# Patient Record
Sex: MALE | Race: WHITE | NOT HISPANIC OR LATINO | Employment: OTHER | URBAN - METROPOLITAN AREA
[De-identification: names, ages, dates, MRNs, and addresses within clinical notes are randomized per-mention and may not be internally consistent; named-entity substitution may affect disease eponyms.]

---

## 2017-11-28 ENCOUNTER — ALLIED HEALTH/NURSE VISIT (OUTPATIENT)
Dept: NURSING | Facility: CLINIC | Age: 62
End: 2017-11-28
Payer: COMMERCIAL

## 2017-11-28 DIAGNOSIS — Z23 NEED FOR PROPHYLACTIC VACCINATION AND INOCULATION AGAINST INFLUENZA: Primary | ICD-10-CM

## 2017-11-28 PROCEDURE — 90686 IIV4 VACC NO PRSV 0.5 ML IM: CPT

## 2017-11-28 PROCEDURE — 99207 ZZC NO CHARGE NURSE ONLY: CPT

## 2017-11-28 PROCEDURE — 90471 IMMUNIZATION ADMIN: CPT

## 2017-11-28 NOTE — MR AVS SNAPSHOT
"              After Visit Summary   2017    Abdelrahman Arriaga    MRN: [id number]           Patient Information     Date Of Birth          1955        Visit Information        Provider Department      2017 11:30 AM CS [place] NURSE [facility]        Today's Diagnoses     Need for prophylactic vaccination and inoculation against influenza    -  1       Follow-ups after your visit        Who to contact     If you have questions or need follow up information about today's clinic visit or your schedule please contact [facility] directly at [phone].  Normal or non-critical lab and imaging results will be communicated to you by TestQuesthart, letter or phone within 4 business days after the clinic has received the results. If you do not hear from us within 7 days, please contact the clinic through Nascentrict or phone. If you have a critical or abnormal lab result, we will notify you by phone as soon as possible.  Submit refill requests through SportsHedge or call your pharmacy and they will forward the refill request to us. Please allow 3 business days for your refill to be completed.          Additional Information About Your Visit        MyChart Information     SportsHedge lets you send messages to your doctor, view your test results, renew your prescriptions, schedule appointments and more. To sign up, go to [email or web address] . Click on \"Log in\" on the left side of the screen, which will take you to the Welcome page. Then click on \"Sign up Now\" on the right side of the page.     You will be asked to enter the access code listed below, as well as some personal information. Please follow the directions to create your username and password.     Your access code is: [email or web address]  Expires: 2018 11:39 AM     Your access code will  in 90 days. If you need help or a new code, please call your [facility] or [phone].        Care EveryWhere ID     This is your Care EveryWhere ID. This " could be used by other organizations to access your [place] medical records  [id number]         Blood Pressure from Last 3 Encounters:   No data found for BP    Weight from Last 3 Encounters:   No data found for Wt              We Performed the Following     FLU VAC, SPLIT VIRUS IM > 3 YO (QUADRIVALENT) [96418]     Vaccine Administration, Initial [64405]        Primary Care Provider    None Specified       No primary provider on file.        Equal Access to Services     [facility]: Hadii aad ku hadasho Soomaali, waaxda luqadaha, qaybta kaalmada adeegyada, waxjesus mccullough asimn adeadalgisa santamariayolandaleeann dumont . So [facility] [phone].    ATENCIÓN: Si habla español, tiene a mondragon disposición servicios gratuitos de asistencia lingüística. Llame al [phone].    We comply with applicable federal civil rights laws and [place] laws. We do not discriminate on the basis of race, color, national origin, age, disability, sex, sexual orientation, or gender identity.            Thank you!     Thank you for choosing [facility]  for your care. Our goal is always to provide you with excellent care. Hearing back from our patients is one way we can continue to improve our services. Please take a few minutes to complete the written survey that you may receive in the mail after your visit with us. Thank you!             Your Updated Medication List - Protect others around you: Learn how to safely use, store and throw away your medicines at www.disposemymeds.org.      Notice  As of 11/28/2017 11:39 AM    You have not been prescribed any medications.

## 2017-11-28 NOTE — NURSING NOTE
Injectable Influenza Immunization Documentation    1.  Is the person to be vaccinated sick today?  No    2. Does the person to be vaccinated have an allergy to eggs or to a component of the vaccine?  No    3. Has the person to be vaccinated today ever had a serious reaction to influenza vaccine in the past?  No    4. Has the person to be vaccinated ever had Guillain-[place] syndrome?  No     Form completed verbally with patient. Beatrice TUCKER MA

## 2018-07-16 ENCOUNTER — OFFICE VISIT (OUTPATIENT)
Dept: URGENT CARE | Facility: URGENT CARE | Age: 63
End: 2018-07-16
Payer: COMMERCIAL

## 2018-07-16 VITALS
SYSTOLIC BLOOD PRESSURE: 141 MMHG | TEMPERATURE: 97 F | OXYGEN SATURATION: 98 % | DIASTOLIC BLOOD PRESSURE: 102 MMHG | HEART RATE: 66 BPM

## 2018-07-16 DIAGNOSIS — H92.02 LEFT EAR PAIN: Primary | ICD-10-CM

## 2018-07-16 PROCEDURE — 99213 OFFICE O/P EST LOW 20 MIN: CPT | Performed by: FAMILY MEDICINE

## 2018-07-16 NOTE — PROGRESS NOTES
SUBJECTIVE:  Abdelrahman Arriaga is a 62 year old male who presents with left ear fullness for 6 week(s).   Severity: mild and moderate   Timing:gradual onset and still present  Additional symptoms include congestion.      History of recurrent otitis: no    No past medical history on file.  No current outpatient prescriptions on file.     Social History   Substance Use Topics     Smoking status: Never Smoker     Smokeless tobacco: Never Used     Alcohol use Not on file       ROS:   CONSTITUTIONAL:NEGATIVE for fever, chills, change in weight  INTEGUMENTARY/SKIN: NEGATIVE for worrisome rashes, moles or lesions    OBJECTIVE:  BP (!) 141/102  Pulse 66  Temp 97  F (36.1  C) (Oral)  SpO2 98%   EXAM:  The right TM is normal: no effusions, no erythema, and normal landmarks     The right auditory canal is normal and without drainage, edema or erythema  The left TM is not visualized secondary to cerumen  The left auditory canal is obstructed with cerumen  Oropharynx exam is normal: no lesions, erythema, adenopathy or exudate.  GENERAL: no acute distress  EYES: EOMI,  PERRL, conjunctiva clear  NECK: supple, non-tender to palpation, no adenopathy noted  RESP: lungs clear to auscultation - no rales, rhonchi or wheezes  CV: regular rates and rhythm, normal S1 S2, no murmur noted  SKIN: no suspicious lesions or rashes     ASSESSMENT:  1. Left ear pain  Patient counseled about cerumen and need for removal.   Patient will use otc mineral oil and warm water lavage at home for prevention.   Pt instructed to come back to the clinic for worsening sx

## 2018-07-16 NOTE — MR AVS SNAPSHOT
"              After Visit Summary   2018    Abdelrahman Arriaga    MRN: [id number]           Patient Information     Date Of Birth          1955        Visit Information        Provider Department      2018 5:15 PM Tomy Estrada MD [facility]        Today's Diagnoses     Left ear pain    -  1       Follow-ups after your visit        Who to contact     If you have questions or need follow up information about today's clinic visit or your schedule please contact [facility] directly at [phone].  Normal or non-critical lab and imaging results will be communicated to you by InVitaehart, letter or phone within 4 business days after the clinic has received the results. If you do not hear from us within 7 days, please contact the clinic through InVitaehart or phone. If you have a critical or abnormal lab result, we will notify you by phone as soon as possible.  Submit refill requests through CafeMom or call your pharmacy and they will forward the refill request to us. Please allow 3 business days for your refill to be completed.          Additional Information About Your Visit        MyChart Information     CafeMom lets you send messages to your doctor, view your test results, renew your prescriptions, schedule appointments and more. To sign up, go to [email or web address] . Click on \"Log in\" on the left side of the screen, which will take you to the Welcome page. Then click on \"Sign up Now\" on the right side of the page.     You will be asked to enter the access code listed below, as well as some personal information. Please follow the directions to create your username and password.     Your access code is: [email or web address]  Expires: 10/14/2018  6:13 PM     Your access code will  in 90 days. If you need help or a new code, please call your [facility] or [phone].        Care EveryWhere ID     This is your Care EveryWhere ID. This could be used by other " organizations to access your [place] medical records  [id number]        Your Vitals Were     Pulse Temperature Pulse Oximetry             66 97  F (36.1  C) (Oral) 98%          Blood Pressure from Last 3 Encounters:   07/16/18 (!) 141/102    Weight from Last 3 Encounters:   No data found for Wt              Today, you had the following     No orders found for display       Primary Care Provider Office Phone # Fax #    Jaydon WHEELER MD [facility] [phone] [address] [facility] [address]  [facility] [address]        Equal Access to Services     [facility]: Hadii aad ku hadasho Soomaali, waaxda luqadaha, qaybta kaalmada adeegyada, waxay idiin hayaan adeeg kharaleeann dumont . So [facility] [phone].    ATENCIÓN: Si habla español, tiene a mondragon disposición servicios gratuitos de asistencia lingüística. Llame al [phone].    We comply with applicable federal civil rights laws and [place] laws. We do not discriminate on the basis of race, color, national origin, age, disability, sex, sexual orientation, or gender identity.            Thank you!     Thank you for choosing [facility]  for your care. Our goal is always to provide you with excellent care. Hearing back from our patients is one way we can continue to improve our services. Please take a few minutes to complete the written survey that you may receive in the mail after your visit with us. Thank you!             Your Updated Medication List - Protect others around you: Learn how to safely use, store and throw away your medicines at www.disposemymeds.org.      Notice  As of 7/16/2018  6:13 PM    You have not been prescribed any medications.

## 2018-11-19 ENCOUNTER — ALLIED HEALTH/NURSE VISIT (OUTPATIENT)
Dept: NURSING | Facility: CLINIC | Age: 63
End: 2018-11-19
Payer: COMMERCIAL

## 2018-11-19 DIAGNOSIS — Z23 NEED FOR PROPHYLACTIC VACCINATION AND INOCULATION AGAINST INFLUENZA: Primary | ICD-10-CM

## 2018-11-19 PROCEDURE — 90471 IMMUNIZATION ADMIN: CPT

## 2018-11-19 PROCEDURE — 90686 IIV4 VACC NO PRSV 0.5 ML IM: CPT

## 2018-11-19 NOTE — PROGRESS NOTES
Injectable Influenza Immunization Documentation    1.  Is the person to be vaccinated sick today?   No    2. Does the person to be vaccinated have an allergy to a component   of the vaccine?   No  Egg Allergy Algorithm Link    3. Has the person to be vaccinated ever had a serious reaction   to influenza vaccine in the past?   No    4. Has the person to be vaccinated ever had Guillain-Barré syndrome?   No    Form completed by Beatrice TUCKER MA    Prior to injection verified patient identity using patient's name and date of birth.  Due to injection administration, patient instructed to remain in clinic for 15 minutes  afterwards, and to report any adverse reaction to me immediately.

## 2018-11-19 NOTE — MR AVS SNAPSHOT
"              After Visit Summary   2018    Abdelrahman Arriaga    MRN: [id number]           Patient Information     Date Of Birth          1955        Visit Information        Provider Department      2018 11:00 AM CS NURSE [facility] [place]        Today's Diagnoses     Need for prophylactic vaccination and inoculation against influenza    -  1       Follow-ups after your visit        Who to contact     If you have questions or need follow up information about today's clinic visit or your schedule please contact [facility] directly at [phone].  Normal or non-critical lab and imaging results will be communicated to you by Airborne Technologyhart, letter or phone within 4 business days after the clinic has received the results. If you do not hear from us within 7 days, please contact the clinic through [employer] or phone. If you have a critical or abnormal lab result, we will notify you by phone as soon as possible.  Submit refill requests through Actito or call your pharmacy and they will forward the refill request to us. Please allow 3 business days for your refill to be completed.          Additional Information About Your Visit        MyChart Information     Actito lets you send messages to your doctor, view your test results, renew your prescriptions, schedule appointments and more. To sign up, go to [email or web address] . Click on \"Log in\" on the left side of the screen, which will take you to the Welcome page. Then click on \"Sign up Now\" on the right side of the page.     You will be asked to enter the access code listed below, as well as some personal information. Please follow the directions to create your username and password.     Your access code is: [email or web address]  Expires: 2019 11:58 AM     Your access code will  in 90 days. If you need help or a new code, please call your [facility] or [phone].        Care EveryWhere ID     This is your Care EveryWhere ID. This " could be used by other organizations to access your [place] medical records  [id number]         Blood Pressure from Last 3 Encounters:   07/16/18 (!) 141/102    Weight from Last 3 Encounters:   No data found for Wt              We Performed the Following     FLU VACCINE, SPLIT VIRUS, IM (QUADRIVALENT) [83765]- >3 YRS        Primary Care Provider Office Phone # Fax #    Jaydon Almonte [phone] [address] [address]        Equal Access to Services     [facility]: Hadii aad ku hadasho Soomaali, waaxda luqadaha, qaybta kaalmada adeegyada, waxay idiin hayaan adeeg kharaleeann dumont . So [facility] [phone].    ATENCIÓN: Si habla español, tiene a mondragon disposición servicios gratuitos de asistencia lingüística. [facility] [phone].    We comply with applicable federal civil rights laws and [place] laws. We do not discriminate on the basis of race, color, national origin, age, disability, sex, sexual orientation, or gender identity.            Thank you!     Thank you for choosing [facility]  for your care. Our goal is always to provide you with excellent care. Hearing back from our patients is one way we can continue to improve our services. Please take a few minutes to complete the written survey that you may receive in the mail after your visit with us. Thank you!             Your Updated Medication List - Protect others around you: Learn how to safely use, store and throw away your medicines at www.disposemymeds.org.      Notice  As of 11/19/2018 11:58 AM    You have not been prescribed any medications.

## 2020-06-15 ENCOUNTER — TRANSFERRED RECORDS (OUTPATIENT)
Dept: HEALTH INFORMATION MANAGEMENT | Facility: CLINIC | Age: 65
End: 2020-06-15

## 2020-11-17 ENCOUNTER — TRANSFERRED RECORDS (OUTPATIENT)
Dept: HEALTH INFORMATION MANAGEMENT | Facility: CLINIC | Age: 65
End: 2020-11-17

## 2021-04-13 ENCOUNTER — TELEPHONE (OUTPATIENT)
Dept: FAMILY MEDICINE | Facility: CLINIC | Age: 66
End: 2021-04-13

## 2021-04-13 NOTE — TELEPHONE ENCOUNTER
Reason for Call:  Same Day Appointment, Requested Provider:  Dr Almonte    PCP: Jaydon Almonte    Reason for visit: pt would like to come back as pt last visit w Dr Almonte was  2016- pt was informed that Dr Almonte is not accepting pt's but he requested that we ask    Duration of symptoms: N/A    Have you been treated for this in the past? No    Additional comments: scheduled for an appt w Dr Morales on 4/14/2021- but would like to have a primary for any follow-up that might be needed    Can we leave a detailed message on this number? YES    Phone number patient can be reached at: Home number on file [phone] (home)    Best Time: any    Call taken on 4/13/2021 at 4:45 PM by Latia Sibley

## 2021-04-14 ENCOUNTER — OFFICE VISIT (OUTPATIENT)
Dept: FAMILY MEDICINE | Facility: CLINIC | Age: 66
End: 2021-04-14
Payer: COMMERCIAL

## 2021-04-14 VITALS
HEIGHT: 70 IN | DIASTOLIC BLOOD PRESSURE: 86 MMHG | BODY MASS INDEX: 24.48 KG/M2 | SYSTOLIC BLOOD PRESSURE: 139 MMHG | WEIGHT: 171 LBS | OXYGEN SATURATION: 97 % | TEMPERATURE: 97 F | HEART RATE: 76 BPM

## 2021-04-14 DIAGNOSIS — B35.3 TINEA PEDIS OF RIGHT FOOT: Primary | ICD-10-CM

## 2021-04-14 DIAGNOSIS — R35.0 URINARY FREQUENCY: ICD-10-CM

## 2021-04-14 PROBLEM — J30.9 ALLERGIC RHINITIS: Status: ACTIVE | Noted: 2021-04-14

## 2021-04-14 PROBLEM — J45.40 MODERATE PERSISTENT ASTHMA: Status: ACTIVE | Noted: 2021-04-14

## 2021-04-14 LAB
ALBUMIN UR-MCNC: NEGATIVE MG/DL
APPEARANCE UR: CLEAR
BILIRUB UR QL STRIP: NEGATIVE
COLOR UR AUTO: YELLOW
GLUCOSE UR STRIP-MCNC: NEGATIVE MG/DL
HBA1C MFR BLD: 5.6 % (ref 0–5.6)
HGB UR QL STRIP: NEGATIVE
KETONES UR STRIP-MCNC: NEGATIVE MG/DL
LEUKOCYTE ESTERASE UR QL STRIP: ABNORMAL
NITRATE UR QL: NEGATIVE
NON-SQ EPI CELLS #/AREA URNS LPF: ABNORMAL /LPF
PH UR STRIP: 7 PH (ref 5–7)
RBC #/AREA URNS AUTO: ABNORMAL /HPF
SOURCE: ABNORMAL
SP GR UR STRIP: 1.02 (ref 1–1.03)
UROBILINOGEN UR STRIP-ACNC: 0.2 EU/DL (ref 0.2–1)
WBC #/AREA URNS AUTO: ABNORMAL /HPF

## 2021-04-14 PROCEDURE — 83036 HEMOGLOBIN GLYCOSYLATED A1C: CPT | Performed by: FAMILY MEDICINE

## 2021-04-14 PROCEDURE — 36415 COLL VENOUS BLD VENIPUNCTURE: CPT | Performed by: FAMILY MEDICINE

## 2021-04-14 PROCEDURE — 81001 URINALYSIS AUTO W/SCOPE: CPT | Performed by: FAMILY MEDICINE

## 2021-04-14 PROCEDURE — 99214 OFFICE O/P EST MOD 30 MIN: CPT | Performed by: FAMILY MEDICINE

## 2021-04-14 RX ORDER — ALBUTEROL SULFATE 90 UG/1
1-2 AEROSOL, METERED RESPIRATORY (INHALATION) EVERY 4 HOURS PRN
COMMUNITY

## 2021-04-14 ASSESSMENT — ENCOUNTER SYMPTOMS
CHILLS: 0
DYSURIA: 0
FEVER: 0
ABDOMINAL PAIN: 0
BACK PAIN: 1
VOMITING: 0
NAUSEA: 0
SHORTNESS OF BREATH: 0
FREQUENCY: 1
HEMATURIA: 0

## 2021-04-14 ASSESSMENT — MIFFLIN-ST. JEOR: SCORE: 1561.9

## 2021-04-14 NOTE — PROGRESS NOTES
Assessment & Plan     Robby was seen today for fungal infection.    Diagnoses and all orders for this visit:    Tinea pedis of right foot, suboptimally treated with over-the-counter Tinactin.  Differential of rash was considered, including (but not limited to) contact dermatitis and Neosporin allergy.  -     terbinafine (LAMISIL) 1 % external cream; Apply topically 2 times daily for 7 days  -     DERMATOLOGY ADULT REFERRAL; Future    Urinary frequency, with history of nocturia.  Urinalysis is negative today.  Hemoglobin A1c is within normal limits, 5.5.  PSA was within normal limits 6/2020.  -     Hemoglobin A1c  -     UA with Microscopic reflex to Culture    Discussed risks and benefits of treatment strategies.    Patient Instructions     Lamisil 1% cream twice daily (rash right foot) x 7 days.    Avoid Neosporin.    Follow-up with primary care or Dermatology if your condition worsens or does not resolve after 1 week.    Return for Follow up, as noted on After Visit Summary.    The patient will consider a routine physical through his usual provider 6/2021.    Nuria Calderon MD  [facility] CAMI Bustamante is a 65 year old male, who presents to clinic today for the following health issues:    Chief Complaint   Patient presents with     Fungal Infection     HPI    The patient is a previous patient of [facility], currently followed by an outside provider.  Patient brings in the labs from his most recent physical, 6/15/2020.  He is uncertain if he was fasting at the time that his labs were obtained, but his glucose was 122 mg/dL.    Patient is concerned about a persistent fungal infection involving his right foot.  Patient reports intermittent tinea pedis infections every 6-8 weeks, which are typically responsive to over-the-counter Tinactin spray.  Patient is concerned that he has a residual rash involving his right foot, post recent Tinactin treatment.  This area is itchy,  "but it is not painful.  The patient has been applying Neosporin to the area, without improvement.  No other changes in soaps, detergents, or household products reported.  The patient states that he frequently swims at the [facility].    Patient is incidentally concerned about persistent urinary frequency.  No dysuria or hematuria, but the patient reports 2-3 episodes of nocturia each evening.  He states that he was advised to follow-up for further diabetic screening, but he has not followed up yet.  His PSA was within normal limits 6/15/2020.    Patient notes intermittent low back pain, but he denies current low back pain.  Remote history of a fall, but no recent trauma/injury.  Patient states he had a recent limp involving his right leg, which has resolved.  The patient denies current radicular symptoms.  No current paresthesias or weakness.    Patient reports a previous right ankle injury/possible sprain 18 years ago, with mild (2-3/10) pain noted involving his right medial malleolus currently.  No recent trauma/injury.    He denies using medications for pain.    Review of Systems   Constitutional: Negative for chills and fever.   Respiratory: Negative for shortness of breath.    Cardiovascular: Negative for chest pain.   Gastrointestinal: Negative for abdominal pain, nausea and vomiting.   Genitourinary: Positive for frequency. Negative for dysuria and hematuria.        Nocturia 2-3 times/night.   Musculoskeletal: Positive for back pain (Intermittent).         Objective    /86 (BP Location: Right arm, Patient Position: Chair, Cuff Size: Adult Large)   Pulse 76   Temp 97  F (36.1  C) (Oral)   Ht 1.778 m (5' 10\")   Wt 77.6 kg (171 lb)   SpO2 97%   BMI 24.54 kg/m    Physical Exam   GENERAL APPEARANCE:  Awake, alert, and in no acute distress.  HEENT:  Sclera anicteric.  No conjunctivitis. No obvious nasal congestion.  No erythema, edema, or exudates of the oral mucosa or posterior pharynx.  Mucous membranes " "moist.  NECK:  Spontaneous full range of motion.    HEART:  Normal S1, S2.  Regular rate and rhythm.  No murmurs, rubs, or gallops.  LUNGS:  No respiratory distress.  No wheezes, rales, or rhonchi.  ABDOMEN:  Not distended.   EXTREMITIES:  Moves 4 extremities, with symmetric strength noted.  No calf asymmetry or peripheral edema.    RIGHT FOOT: No gross abnormalities or ecchymosis.  Full range of motion of the foot and ankle joints, without edema noted.  Not tender over the malleoli.  Not tender over the remainder of the foot, ankle, and lower leg.  Achilles tendon palpates intact.  There is a 1.5 x 3 cm area of pinkish erythema with subtle, overlying scale noted overlying the first metatarsal.  No other areas of rash noted.  No drainage.  Distal pulses intact.  Toenails are long, poorly manicured.  Distal pulses are 2+ and bounding.    NEUROLOGIC:  Gait within normal limits.  No facial droop or acute neurologic deficits.  Patellar and Achilles reflexes are 2/5 and symmetric.  PSYCHIATRIC:  Tangential, verbose historian.  Does not provide direct answers to questions.  Rude and insulting at times.  \"Are you planning to find a clinic and become a real doctor?\"    LABORATORY:  Office Visit on 04/14/2021   Component Date Value Ref Range Status     Hemoglobin A1C 04/14/2021 5.6  0 - 5.6 % Final    Comment: Normal <5.7% Prediabetes 5.7-6.4%  Diabetes 6.5% or higher - adopted from ADA   consensus guidelines.       Color Urine 04/14/2021 Yellow   Final     Appearance Urine 04/14/2021 Clear   Final     Glucose Urine 04/14/2021 Negative  NEG^Negative mg/dL Final     Bilirubin Urine 04/14/2021 Negative  NEG^Negative Final     Ketones Urine 04/14/2021 Negative  NEG^Negative mg/dL Final     Specific Gravity Urine 04/14/2021 1.020  1.003 - 1.035 Final     pH Urine 04/14/2021 7.0  5.0 - 7.0 pH Final     Protein Albumin Urine 04/14/2021 Negative  NEG^Negative mg/dL Final     Urobilinogen Urine 04/14/2021 0.2  0.2 - 1.0 EU/dL Final "     Nitrite Urine 04/14/2021 Negative  NEG^Negative Final     Blood Urine 04/14/2021 Negative  NEG^Negative Final     Leukocyte Esterase Urine 04/14/2021 Trace* NEG^Negative Final     Source 04/14/2021 Midstream Urine   Final     WBC Urine 04/14/2021 0 - 5  OTO5^0 - 5 /HPF Final     RBC Urine 04/14/2021 O - 2  OTO2^O - 2 /HPF Final     Squamous Epithelial /LPF Urine 04/14/2021 Few  FEW^Few /LPF Final        Disclaimer: This dictation was composed using a combination of keyboarding and voice recognition software.  As a result, there may be errors in the dictation that have gone undetected.  Please consider this when interpreting the information found in this chart.

## 2021-04-14 NOTE — PATIENT INSTRUCTIONS
Lamisil 1% cream twice daily (rash right foot) x 7 days.    Avoid Neosporin.    Follow-up with primary care or Dermatology if your condition worsens or does not resolve after 1 week.

## 2021-04-14 NOTE — LETTER
April 14, 2021      Robby Arriaga  [address]   [place] [address]        Dear ,    We are writing to inform you of your test results.    Your test results fall within the expected range(s) or remain unchanged from previous results.  Please continue with current treatment plan.    Resulted Orders   Hemoglobin A1c   Result Value Ref Range    Hemoglobin A1C 5.6 0 - 5.6 %      Comment:      Normal <5.7% Prediabetes 5.7-6.4%  Diabetes 6.5% or higher - adopted from ADA   consensus guidelines.     UA with Microscopic reflex to Culture   Result Value Ref Range    Color Urine Yellow     Appearance Urine Clear     Glucose Urine Negative NEG^Negative mg/dL    Bilirubin Urine Negative NEG^Negative    Ketones Urine Negative NEG^Negative mg/dL    Specific Gravity Urine 1.020 1.003 - 1.035    pH Urine 7.0 5.0 - 7.0 pH    Protein Albumin Urine Negative NEG^Negative mg/dL    Urobilinogen Urine 0.2 0.2 - 1.0 EU/dL    Nitrite Urine Negative NEG^Negative    Blood Urine Negative NEG^Negative    Leukocyte Esterase Urine Trace (A) NEG^Negative    Source Midstream Urine     WBC Urine 0 - 5 OTO5^0 - 5 /HPF    RBC Urine O - 2 OTO2^O - 2 /HPF    Squamous Epithelial /LPF Urine Few FEW^Few /LPF       If you have any questions or concerns, please call the clinic at the number listed above.       Sincerely,      Nuria Calderon MD  sri

## 2021-05-25 NOTE — PROGRESS NOTES
[facility] - PRIMARY CARE SKIN    CC: rash on the top of the right foot  SUBJECTIVE:   Robby Arriaga is a(n) 65 year old male who presents to clinic today because of the following issue(s) :      Issue one :            History of itchy rash between the toes , this come and goes. Scratching at it makes it worse. This episode started on the top of the foot. Duration - 2 months ago. Treated with Lamisil bid .  The top of the foot is still red and if driving for long distance then it starts itching and burning more. If he takes the shoe off the itching and burning stops.  This started after wearing a new pair of shoes .  Personal Medical History  Skin Cancer: NO  Eczema Psoriasis Lupus   NO NO NO   Other:   .    Family Medical History  Skin Cancer: NO  Eczema Psoriasis Lupus   NO NO NO   Other:   .  Occupation: indoor     Refer to electronic medical record (EMR) for past medical history and medications.    ROS: 14 point review of systems was negative except the symptoms listed above in the HPI. No other skin involvement      OBJECTIVE:   GENERAL: healthy, alert and no distress.  HEENT: PERRL. Conjunctiva, sclera clear.  SKIN: Tomas Skin Type - II.  Feet , face , arms examined. The dermatoscope was used to help evaluate pigmented lesions.  Skin Pertinent Findings:  Dorsum of the right foot- erythema overlying the first metatarsal, 6 mm indurated, erythema over prominence over the proximal metatarsal    Diagnostic Test Results:  none     ASSESSMENT:     Encounter Diagnosis   Name Primary?     Dermatitis Yes     MDM: I suspect this was initially triggered by the new shoes and now continues to be irritated    PLAN:   Patient Instructions   Betamethasone diproprinate 0.05% cream- apply to affected area two times per day for 14 days     Recheck in 2 weeks

## 2021-05-26 ENCOUNTER — OFFICE VISIT (OUTPATIENT)
Dept: FAMILY MEDICINE | Facility: CLINIC | Age: 66
End: 2021-05-26
Payer: COMMERCIAL

## 2021-05-26 VITALS — DIASTOLIC BLOOD PRESSURE: 78 MMHG | SYSTOLIC BLOOD PRESSURE: 122 MMHG

## 2021-05-26 DIAGNOSIS — L30.9 DERMATITIS: Primary | ICD-10-CM

## 2021-05-26 PROCEDURE — 99213 OFFICE O/P EST LOW 20 MIN: CPT | Performed by: FAMILY MEDICINE

## 2021-05-26 RX ORDER — BETAMETHASONE DIPROPIONATE 0.5 MG/G
CREAM TOPICAL 2 TIMES DAILY
Qty: 30 G | Refills: 0 | Status: SHIPPED | OUTPATIENT
Start: 2021-05-26

## 2021-05-26 RX ORDER — PRENATAL VIT 91/IRON/FOLIC/DHA 28-975-200
COMBINATION PACKAGE (EA) ORAL 2 TIMES DAILY
COMMUNITY

## 2021-05-26 NOTE — LETTER
5/26/2021         RE: Robby Arriaga  [address]  [place] [address]        Dear Colleague,    Thank you for referring your patient, Robby Arriaga, to the [facility]. Please see a copy of my visit note below.    [facility] - PRIMARY CARE SKIN    CC: rash on the top of the right foot  SUBJECTIVE:   Robby Arriaga is a(n) 65 year old male who presents to clinic today because of the following issue(s) :      Issue one :            History of itchy rash between the toes , this come and goes. Scratching at it makes it worse. This episode started on the top of the foot. Duration - 2 months ago. Treated with Lamisil bid .  The top of the foot is still red and if driving for long distance then it starts itching and burning more. If he takes the shoe off the itching and burning stops.  This started after wearing a new pair of shoes .  Personal Medical History  Skin Cancer: NO  Eczema Psoriasis Lupus   NO NO NO   Other:   .    Family Medical History  Skin Cancer: NO  Eczema Psoriasis Lupus   NO NO NO   Other:   .  Occupation: indoor     Refer to electronic medical record (EMR) for past medical history and medications.    ROS: 14 point review of systems was negative except the symptoms listed above in the HPI. No other skin involvement      OBJECTIVE:   GENERAL: healthy, alert and no distress.  HEENT: PERRL. Conjunctiva, sclera clear.  SKIN: Tomas Skin Type - II.  Feet , face , arms examined. The dermatoscope was used to help evaluate pigmented lesions.  Skin Pertinent Findings:  Dorsum of the right foot- erythema overlying the first metatarsal, 6 mm indurated, erythema over prominence over the proximal metatarsal    Diagnostic Test Results:  none     ASSESSMENT:     Encounter Diagnosis   Name Primary?     Dermatitis Yes     MDM: I suspect this was initially triggered by the new shoes and now continues to be irritated    PLAN:   Patient Instructions    Betamethasone diproprinate 0.05% cream- apply to affected area two times per day for 14 days     Recheck in 2 weeks            Again, thank you for allowing me to participate in the care of your patient.        Sincerely,        Nataliia Early MD

## 2021-05-26 NOTE — PATIENT INSTRUCTIONS
Betamethasone diproprinate 0.05% cream- apply to affected area two times per day for 14 days     Recheck in 2 weeks

## 2021-06-03 ENCOUNTER — OFFICE VISIT (OUTPATIENT)
Dept: FAMILY MEDICINE | Facility: CLINIC | Age: 66
End: 2021-06-03
Payer: COMMERCIAL

## 2021-06-03 ENCOUNTER — TRANSFERRED RECORDS (OUTPATIENT)
Dept: FAMILY MEDICINE | Facility: CLINIC | Age: 66
End: 2021-06-03

## 2021-06-03 VITALS
SYSTOLIC BLOOD PRESSURE: 132 MMHG | BODY MASS INDEX: 25.17 KG/M2 | HEIGHT: 70 IN | OXYGEN SATURATION: 98 % | WEIGHT: 175.8 LBS | DIASTOLIC BLOOD PRESSURE: 90 MMHG | HEART RATE: 70 BPM | TEMPERATURE: 97.7 F

## 2021-06-03 DIAGNOSIS — M79.671 RIGHT FOOT PAIN: Primary | ICD-10-CM

## 2021-06-03 PROCEDURE — 99214 OFFICE O/P EST MOD 30 MIN: CPT | Performed by: INTERNAL MEDICINE

## 2021-06-03 SDOH — HEALTH STABILITY: MENTAL HEALTH: HOW OFTEN DO YOU HAVE 6 OR MORE DRINKS ON ONE OCCASION?: NOT ASKED

## 2021-06-03 SDOH — HEALTH STABILITY: MENTAL HEALTH: HOW MANY STANDARD DRINKS CONTAINING ALCOHOL DO YOU HAVE ON A TYPICAL DAY?: NOT ASKED

## 2021-06-03 SDOH — HEALTH STABILITY: MENTAL HEALTH: HOW OFTEN DO YOU HAVE A DRINK CONTAINING ALCOHOL?: NOT ASKED

## 2021-06-03 ASSESSMENT — MIFFLIN-ST. JEOR: SCORE: 1583.67

## 2021-06-03 NOTE — PROGRESS NOTES
"    Assessment & Plan     Right foot pain  I really do not think this is as much a dermatological problem as it is a podiatric problem.  He has a bony abnormality of the foot which is causing friction with his footwear.  His skin seems to have improved with changing his footwear but did not really seem to respond to the topical steroid.  I think that a consultation with a podiatrist would be of some use.  Perhaps a custom insert or different footwear could take some of the pressure off of the bony abnormality of the dorsum of the foot which would improve the skin.  Also, it may help with symptoms related to his very flat foot.  I wonder if the burning and tingling pain in the metatarsal region of his foot might represent a Saucedo's neuroma?  These questions can be answered by a podiatrist and I made a referral.  However, he is concerned that he would be out of network within the [facility] system.  I told him that he could probably see a podiatrist that is within his network for a more cost effective way to get these questions answered.    - Orthopedic & Spine  Referral; Future      30 minutes spent on the date of the encounter doing chart review, history and exam, documentation and further activities per the note       BMI:   Estimated body mass index is 25.22 kg/m  as calculated from the following:    Height as of this encounter: 1.778 m (5' 10\").    Weight as of this encounter: 79.7 kg (175 lb 12.8 oz).   Weight management plan: Discussed healthy diet and exercise guidelines        Return in about 2 months (around 8/3/2021) for Preventive Visit.  Patient instructed to return to clinic or contact us sooner if symptoms worsen or new symptoms develop.     Jaydon Almonte MD  [facility] CAMI Bustamante is a 65 year old who presents for the following health issues     HPI     New Patient/Transfer of Care      This is a 65-year-old man with asthma and allergies who I last saw more than " "5 years ago in my previous clinic to the best of my recollection.  He was seen by another provider in this office with complaints of a rash on his foot.  He was treated for athlete's foot.  He followed up with a  who recommended a topical steroid.  The rash did not really seem to respond to a topical steroid but seem to improve when he changed his footwear.  He describes a burning and tingling sensation throughout his foot as well.    Review of Systems         Objective    BP (!) 132/90 (BP Location: Right arm, Cuff Size: Adult Large)   Pulse 70   Temp 97.7  F (36.5  C) (Oral)   Ht 1.778 m (5' 10\")   Wt 79.7 kg (175 lb 12.8 oz)   SpO2 98%   BMI 25.22 kg/m    Body mass index is 25.22 kg/m .  Physical Exam   General: This is a pleasant, well-appearing man in no acute distress.  Foot: There is a bony abnormality on the dorsum of the right foot and mild skin irritation overlying the deformity.  He also has a very flat arch.  This does not have a classic appearance of athlete's foot or psoriasis.  Mental state: He has a mildly unusual affect, his speech tends to be tangential at times, he is well-groomed, the history is challenging                "

## 2021-06-09 ENCOUNTER — OFFICE VISIT (OUTPATIENT)
Dept: FAMILY MEDICINE | Facility: CLINIC | Age: 66
End: 2021-06-09
Payer: COMMERCIAL

## 2021-06-09 VITALS — SYSTOLIC BLOOD PRESSURE: 130 MMHG | DIASTOLIC BLOOD PRESSURE: 72 MMHG

## 2021-06-09 DIAGNOSIS — L24.9 IRRITANT DERMATITIS: Primary | ICD-10-CM

## 2021-06-09 PROCEDURE — 99213 OFFICE O/P EST LOW 20 MIN: CPT | Performed by: FAMILY MEDICINE

## 2021-06-09 NOTE — PATIENT INSTRUCTIONS
Trial of       Mole form padding for feet      Cut rectangular size and place over the site of rubbing to decrease the irritation

## 2021-06-09 NOTE — LETTER
6/9/2021         RE: Robby Arriaga  [address]  [place] [address]        Dear Colleague,    Thank you for referring your patient, Robby Arriaga, to the [facility]. Please see a copy of my visit note below.    [facility] - PRIMARY CARE SKIN    CC: rash on the top of the right foot  SUBJECTIVE:   Robby Arriaga is a(n) 65 year old male who presents to clinic today because of the following issue(s) :      Issue one :           Follow up of irritated patch on the top of the foot.Treatment : betamethasone diproprinate 0.05% cream. Response to treatment : improved but still some irritation and burning.      History of itchy rash between the toes , this come and goes. Scratching at it makes it worse. This episode started on the top of the foot. Duration - 2 months ago. Treated with Lamisil bid .  The top of the foot is still red and if driving for long distance then it starts itching and burning more. If he takes the shoe off the itching and burning stops.  This started after wearing a new pair of shoes .  Personal Medical History  Skin Cancer: NO  Eczema Psoriasis Lupus   NO NO NO   Other:   .    Family Medical History  Skin Cancer: NO  Eczema Psoriasis Lupus   NO NO NO   Other:   .  Occupation: indoor     Refer to electronic medical record (EMR) for past medical history and medications.    ROS: 14 point review of systems was negative except the symptoms listed above in the HPI. No other skin involvement      OBJECTIVE:   GENERAL: healthy, alert and no distress.  HEENT: PERRL. Conjunctiva, sclera clear.  SKIN: Tomas Skin Type - II.  Feet , examined  Skin Pertinent Findings:  Dorsum of the right foot- erythema overlying the first metatarsal, residual erythema over prominence over the proximal metatarsal    Diagnostic Test Results:  none     ASSESSMENT:     Encounter Diagnosis   Name Primary?     Irritant dermatitis Yes     MDM: I suspect this was  initially triggered by the new shoes and now continues to be irritated    PLAN:   Patient Instructions   Trial of       Mole form padding for feet      Cut rectangular size and place over the site of rubbing to decrease the irritation            Again, thank you for allowing me to participate in the care of your patient.        Sincerely,        Nataliia Early MD

## 2021-06-09 NOTE — PROGRESS NOTES
[facility] - PRIMARY CARE SKIN    CC: rash on the top of the right foot  SUBJECTIVE:   Robby Arriaga is a(n) 65 year old male who presents to clinic today because of the following issue(s) :      Issue one :           Follow up of irritated patch on the top of the foot.Treatment : betamethasone diproprinate 0.05% cream. Response to treatment : improved but still some irritation and burning.      History of itchy rash between the toes , this come and goes. Scratching at it makes it worse. This episode started on the top of the foot. Duration - 2 months ago. Treated with Lamisil bid .  The top of the foot is still red and if driving for long distance then it starts itching and burning more. If he takes the shoe off the itching and burning stops.  This started after wearing a new pair of shoes .  Personal Medical History  Skin Cancer: NO  Eczema Psoriasis Lupus   NO NO NO   Other:   .    Family Medical History  Skin Cancer: NO  Eczema Psoriasis Lupus   NO NO NO   Other:   .  Occupation: indoor     Refer to electronic medical record (EMR) for past medical history and medications.    ROS: 14 point review of systems was negative except the symptoms listed above in the HPI. No other skin involvement      OBJECTIVE:   GENERAL: healthy, alert and no distress.  HEENT: PERRL. Conjunctiva, sclera clear.  SKIN: Tomas Skin Type - II.  Feet , examined  Skin Pertinent Findings:  Dorsum of the right foot- erythema overlying the first metatarsal, residual erythema over prominence over the proximal metatarsal    Diagnostic Test Results:  none     ASSESSMENT:     Encounter Diagnosis   Name Primary?     Irritant dermatitis Yes     MDM: I suspect this was initially triggered by the new shoes and now continues to be irritated    PLAN:   Patient Instructions   Trial of       Mole form padding for feet      Cut rectangular size and place over the site of rubbing to decrease the irritation

## 2021-11-03 ENCOUNTER — TRANSFERRED RECORDS (OUTPATIENT)
Dept: HEALTH INFORMATION MANAGEMENT | Facility: CLINIC | Age: 66
End: 2021-11-03
Payer: COMMERCIAL

## 2023-11-03 ENCOUNTER — TRANSFERRED RECORDS (OUTPATIENT)
Dept: HEALTH INFORMATION MANAGEMENT | Facility: CLINIC | Age: 68
End: 2023-11-03
Payer: COMMERCIAL

## 2024-12-11 ENCOUNTER — TRANSFERRED RECORDS (OUTPATIENT)
Dept: HEALTH INFORMATION MANAGEMENT | Facility: CLINIC | Age: 69
End: 2024-12-11
Payer: COMMERCIAL

## 2025-03-06 ENCOUNTER — OFFICE VISIT (OUTPATIENT)
Dept: FAMILY MEDICINE | Facility: CLINIC | Age: 70
End: 2025-03-06
Payer: MEDICARE

## 2025-03-06 VITALS
DIASTOLIC BLOOD PRESSURE: 76 MMHG | HEART RATE: 78 BPM | OXYGEN SATURATION: 95 % | RESPIRATION RATE: 16 BRPM | HEIGHT: 69 IN | WEIGHT: 190.4 LBS | BODY MASS INDEX: 28.2 KG/M2 | TEMPERATURE: 98.1 F | SYSTOLIC BLOOD PRESSURE: 154 MMHG

## 2025-03-06 DIAGNOSIS — J45.40 MODERATE PERSISTENT ASTHMA, UNSPECIFIED WHETHER COMPLICATED: ICD-10-CM

## 2025-03-06 DIAGNOSIS — Z12.11 SCREEN FOR COLON CANCER: ICD-10-CM

## 2025-03-06 DIAGNOSIS — Z11.59 NEED FOR HEPATITIS C SCREENING TEST: ICD-10-CM

## 2025-03-06 DIAGNOSIS — Z00.00 ENCOUNTER FOR MEDICARE ANNUAL WELLNESS EXAM: Primary | ICD-10-CM

## 2025-03-06 DIAGNOSIS — Z13.1 SCREENING FOR DIABETES MELLITUS: ICD-10-CM

## 2025-03-06 DIAGNOSIS — Z12.5 PROSTATE CANCER SCREENING: ICD-10-CM

## 2025-03-06 DIAGNOSIS — R03.0 ELEVATED BLOOD PRESSURE READING WITHOUT DIAGNOSIS OF HYPERTENSION: ICD-10-CM

## 2025-03-06 DIAGNOSIS — Z13.220 LIPID SCREENING: ICD-10-CM

## 2025-03-06 LAB
ALBUMIN SERPL BCG-MCNC: 4.3 G/DL (ref 3.5–5.2)
ALP SERPL-CCNC: 90 U/L (ref 40–150)
ALT SERPL W P-5'-P-CCNC: 32 U/L (ref 0–70)
ANION GAP SERPL CALCULATED.3IONS-SCNC: 9 MMOL/L (ref 7–15)
AST SERPL W P-5'-P-CCNC: 25 U/L (ref 0–45)
BILIRUB SERPL-MCNC: 0.2 MG/DL
BUN SERPL-MCNC: 11.1 MG/DL (ref 8–23)
CALCIUM SERPL-MCNC: 9.8 MG/DL (ref 8.8–10.4)
CHLORIDE SERPL-SCNC: 105 MMOL/L (ref 98–107)
CHOLEST SERPL-MCNC: 176 MG/DL
CREAT SERPL-MCNC: 0.81 MG/DL (ref 0.67–1.17)
EGFRCR SERPLBLD CKD-EPI 2021: >90 ML/MIN/1.73M2
ERYTHROCYTE [DISTWIDTH] IN BLOOD BY AUTOMATED COUNT: 13 % (ref 10–15)
EST. AVERAGE GLUCOSE BLD GHB EST-MCNC: 120 MG/DL
FASTING STATUS PATIENT QL REPORTED: NO
FASTING STATUS PATIENT QL REPORTED: NO
GLUCOSE SERPL-MCNC: 118 MG/DL (ref 70–99)
HBA1C MFR BLD: 5.8 % (ref 0–5.6)
HCO3 SERPL-SCNC: 27 MMOL/L (ref 22–29)
HCT VFR BLD AUTO: 44.9 % (ref 40–53)
HCV AB SERPL QL IA: NONREACTIVE
HDLC SERPL-MCNC: 51 MG/DL
HGB BLD-MCNC: 15 G/DL (ref 13.3–17.7)
LDLC SERPL CALC-MCNC: 92 MG/DL
MCH RBC QN AUTO: 31 PG (ref 26.5–33)
MCHC RBC AUTO-ENTMCNC: 33.4 G/DL (ref 31.5–36.5)
MCV RBC AUTO: 93 FL (ref 78–100)
NONHDLC SERPL-MCNC: 125 MG/DL
PLATELET # BLD AUTO: 215 10E3/UL (ref 150–450)
POTASSIUM SERPL-SCNC: 3.8 MMOL/L (ref 3.4–5.3)
PROT SERPL-MCNC: 6.8 G/DL (ref 6.4–8.3)
PSA SERPL DL<=0.01 NG/ML-MCNC: 6.63 NG/ML (ref 0–6.5)
RBC # BLD AUTO: 4.84 10E6/UL (ref 4.4–5.9)
SODIUM SERPL-SCNC: 141 MMOL/L (ref 135–145)
TRIGL SERPL-MCNC: 163 MG/DL
WBC # BLD AUTO: 5.7 10E3/UL (ref 4–11)

## 2025-03-06 SDOH — HEALTH STABILITY: PHYSICAL HEALTH: ON AVERAGE, HOW MANY DAYS PER WEEK DO YOU ENGAGE IN MODERATE TO STRENUOUS EXERCISE (LIKE A BRISK WALK)?: 2 DAYS

## 2025-03-06 ASSESSMENT — SOCIAL DETERMINANTS OF HEALTH (SDOH): HOW OFTEN DO YOU GET TOGETHER WITH FRIENDS OR RELATIVES?: TWICE A WEEK

## 2025-03-06 ASSESSMENT — ASTHMA QUESTIONNAIRES
QUESTION_4 LAST FOUR WEEKS HOW OFTEN HAVE YOU USED YOUR RESCUE INHALER OR NEBULIZER MEDICATION (SUCH AS ALBUTEROL): TWO OR THREE TIMES PER WEEK
QUESTION_3 LAST FOUR WEEKS HOW OFTEN DID YOUR ASTHMA SYMPTOMS (WHEEZING, COUGHING, SHORTNESS OF BREATH, CHEST TIGHTNESS OR PAIN) WAKE YOU UP AT NIGHT OR EARLIER THAN USUAL IN THE MORNING: ONCE OR TWICE
QUESTION_1 LAST FOUR WEEKS HOW MUCH OF THE TIME DID YOUR ASTHMA KEEP YOU FROM GETTING AS MUCH DONE AT WORK, SCHOOL OR AT HOME: A LITTLE OF THE TIME
ACT_TOTALSCORE: 19
QUESTION_5 LAST FOUR WEEKS HOW WOULD YOU RATE YOUR ASTHMA CONTROL: WELL CONTROLLED
ACT_TOTALSCORE: 19
QUESTION_2 LAST FOUR WEEKS HOW OFTEN HAVE YOU HAD SHORTNESS OF BREATH: ONCE OR TWICE A WEEK

## 2025-03-06 ASSESSMENT — PAIN SCALES - GENERAL: PAINLEVEL_OUTOF10: NO PAIN (0)

## 2025-03-06 NOTE — PATIENT INSTRUCTIONS
"Go on amaz[employer] and order a home blood pressure cuff.  Make sure the home blood pressure cuff checks your blood pressure on your arm not on your wrist.  Wrist blood pressure cuffs are NOT accurate.  Omron is a good blood pressure cuff brand.    Take your blood pressure after 5 minutes of rest in the AM and PM for 3 days and record the readings (6 total readings).  Make sure your feet are flat on the floor and your back is supported when you check your blood pressure.  If the initial blood pressure reading is too high, wait another 5 minutes and check again and record the better of the two readings.      Send me a [employer] message or call us and leave a message with an RN (nurse) with the 6 blood pressure readings.        Our goal is for the average of these readings to be less than 140/90.    You should get a tetanus shot (Tdap), RSV vaccine and the shingles vaccine series \"SHINGRIX\" at a pharmacy.       Patient Education   Preventive Care Advice   This is general advice given by our system to help you stay healthy. However, your care team may have specific advice just for you. Please talk to your care team about your preventive care needs.  Nutrition  Eat 5 or more servings of fruits and vegetables each day.  Try wheat bread, brown rice and whole grain pasta (instead of white bread, rice, and pasta).  Get enough calcium and vitamin D. Check the label on foods and aim for 100% of the RDA (recommended daily allowance).  Lifestyle  Exercise at least 150 minutes each week  (30 minutes a day, 5 days a week).  Do muscle strengthening activities 2 days a week. These help control your weight and prevent disease.  No smoking.  Wear sunscreen to prevent skin cancer.  Have a dental exam and cleaning every 6 months.  Yearly exams  See your health care team every year to talk about:  Any changes in your health.  Any medicines your care team has prescribed.  Preventive care, family planning, and ways to prevent chronic " diseases.  Shots (vaccines)   HPV shots (up to age 26), if you've never had them before.  Hepatitis B shots (up to age 59), if you've never had them before.  COVID-19 shot: Get this shot when it's due.  Flu shot: Get a flu shot every year.  Tetanus shot: Get a tetanus shot every 10 years.  Pneumococcal, hepatitis A, and RSV shots: Ask your care team if you need these based on your risk.  Shingles shot (for age 50 and up)  General health tests  Diabetes screening:  Starting at age 35, Get screened for diabetes at least every 3 years.  If you are younger than age 35, ask your care team if you should be screened for diabetes.  Cholesterol test: At age 39, start having a cholesterol test every 5 years, or more often if advised.  Bone density scan (DEXA): At age 50, ask your care team if you should have this scan for osteoporosis (brittle bones).  Hepatitis C: Get tested at least once in your life.  STIs (sexually transmitted infections)  Before age 24: Ask your care team if you should be screened for STIs.  After age 24: Get screened for STIs if you're at risk. You are at risk for STIs (including HIV) if:  You are sexually active with more than one person.  You don't use condoms every time.  You or a partner was diagnosed with a sexually transmitted infection.  If you are at risk for HIV, ask about PrEP medicine to prevent HIV.  Get tested for HIV at least once in your life, whether you are at risk for HIV or not.  Cancer screening tests  Cervical cancer screening: If you have a cervix, begin getting regular cervical cancer screening tests starting at age 21.  Breast cancer scan (mammogram): If you've ever had breasts, begin having regular mammograms starting at age 40. This is a scan to check for breast cancer.  Colon cancer screening: It is important to start screening for colon cancer at age 45.  Have a colonoscopy test every 10 years (or more often if you're at risk) Or, ask your provider about stool tests like a  FIT test every year or Cologuard test every 3 years.  To learn more about your testing options, visit:   .  For help making a decision, visit:   https://bit.ly/pi35366.  Prostate cancer screening test: If you have a prostate, ask your care team if a prostate cancer screening test (PSA) at age 55 is right for you.  Lung cancer screening: If you are a current or former smoker ages 50 to 80, ask your care team if ongoing lung cancer screenings are right for you.  For informational purposes only. Not to replace the advice of your health care provider. Copyright   2023 [email or web address]. All rights reserved. Clinically reviewed by the [facility] Transitions Program. [employer] [id number] - REV 01/24.

## 2025-03-06 NOTE — NURSING NOTE
Prior to immunization administration, verified patients identity using patient s name and date of birth. Please see Immunization Activity for additional information.     Screening Questionnaire for Adult Immunization    Are you sick today?   No   Do you have allergies to medications, food, a vaccine component or latex?   No   Have you ever had a serious reaction after receiving a vaccination?   No   Do you have a long-term health problem with heart, lung, kidney, or metabolic disease (e.g., diabetes), asthma, a blood disorder, no spleen, complement component deficiency, a cochlear implant, or a spinal fluid leak?  Are you on long-term aspirin therapy?   Yes   Do you have cancer, leukemia, HIV/AIDS, or any other immune system problem?   No   Do you have a parent, brother, or sister with an immune system problem?   No   In the past 3 months, have you taken medications that affect  your immune system, such as prednisone, other steroids, or anticancer drugs; drugs for the treatment of rheumatoid arthritis, Crohn s disease, or psoriasis; or have you had radiation treatments?   No   Have you had a seizure, or a brain or other nervous system problem?   No   During the past year, have you received a transfusion of blood or blood    products, or been given immune (gamma) globulin or antiviral drug?   No   For women: Are you pregnant or is there a chance you could become       pregnant during the next month?   No   Have you received any vaccinations in the past 4 weeks?   No     Immunization questionnaire was positive for at least one answer.  Notified Dr. Almonte.      Patient instructed to remain in clinic for 15 minutes afterwards, and to report any adverse reactions.     Screening performed by Emma Kinsey CMA on 3/6/2025 at 3:48 PM.

## 2025-03-06 NOTE — PROGRESS NOTES
Preventive Care Visit  [facility]  Jaydon Almonte MD, Internal Medicine  Mar 6, 2025      Assessment & Plan     Encounter for Medicare annual wellness exam    - Comprehensive metabolic panel; Future  - CBC with platelets; Future  - Comprehensive metabolic panel  - CBC with platelets    Elevated blood pressure reading without diagnosis of hypertension  We discussed that the blood pressure is too high  We discussed the option of starting drug therapy today  We discussed the risks of uncontrolled high blood pressure including risk for heart attack, stroke, kidney disease  We discussed that often office blood pressures are higher than home blood pressures and that home blood pressures reflect risk for disease better than office blood pressure readings  Given that information, he would like to go home and check some home blood pressure readings  He was given counseling on how to do so accurately  He was asked to contact us with his home blood pressure readings so we can advise him on management as per patient instructions    Moderate persistent asthma, unspecified whether complicated  This seems to be under good control, continue follow-up with asthma allergy specialist    Screen for colon cancer  I reminded him that he is overdue for colonoscopy and sent a referral to [facility] where he had his last colonoscopy  - Colonoscopy Screening  Referral; Future    Need for hepatitis C screening test    - Hepatitis C Screen Reflex to HCV RNA Quant and Genotype; Future  - Hepatitis C Screen Reflex to HCV RNA Quant and Genotype    Screening for diabetes mellitus    - Hemoglobin A1c; Future  - Hemoglobin A1c    Lipid screening    - Lipid panel reflex to direct LDL Fasting; Future  - Lipid panel reflex to direct LDL Fasting    Prostate cancer screening    - PSA, screen; Future  - PSA, screen    Patient has been advised of split billing requirements and indicates understanding:  "Yes        BMI  Estimated body mass index is 28.53 kg/m  as calculated from the following:    Height as of this encounter: 1.74 m (5' 8.5\").    Weight as of this encounter: 86.4 kg (190 lb 6.4 oz).   Weight management plan: Discussed healthy diet and exercise guidelines    Counseling  Appropriate preventive services were addressed with this patient via screening, questionnaire, or discussion as appropriate for fall prevention, nutrition, physical activity, Tobacco-use cessation, social engagement, weight loss and cognition.  Checklist reviewing preventive services available has been given to the patient.  Reviewed patient's diet, addressing concerns and/or questions.   He is at risk for lack of exercise and has been provided with information to increase physical activity for the benefit of his well-being.   He declined my recommendation for a COVID shot, see patient instructions regarding other vaccine recommendations, PCV 20 in clinic today    FUTURE APPOINTMENTS:       - Follow-up for annual visit or as needed pending home blood pressures    Anurag Bustamante is a 69 year old, presenting for the following:  Physical         No data to display                    HPI       Wt Readings from Last 4 Encounters:   03/06/25 86.4 kg (190 lb 6.4 oz)   06/03/21 79.7 kg (175 lb 12.8 oz)   04/14/21 77.6 kg (171 lb)     BP Readings from Last 6 Encounters:   03/06/25 (!) 164/73   06/09/21 130/72   06/03/21 (!) 132/90   05/26/21 122/78   04/14/21 139/86   07/16/18 (!) 141/102     69-year-old man who I last saw in 2021 and is here for preventive exam  He sees me rather infrequently  Does follow-up regularly with his asthma allergy specialist and is receiving a allergy shots  He describes his asthma is under good control on his current inhalers  He was surprised to see that his blood pressure was elevated in clinic today    Advance Care Planning  Patient does not have a Health Care Directive:       3/6/2025   General Health   How " would you rate your overall physical health? Good   Feel stress (tense, anxious, or unable to sleep) Only a little   (!) STRESS CONCERN      3/6/2025   Nutrition   Diet: I don't know         3/6/2025   Exercise   Days per week of moderate/strenous exercise 2 days   (!) EXERCISE CONCERN      3/6/2025   Social Factors   Frequency of gathering with friends or relatives Twice a week   Worry food won't last until get money to buy more No   Food not last or not have enough money for food? No   Do you have housing? (Housing is defined as stable permanent housing and does not include staying ouside in a car, in a tent, in an abandoned building, in an overnight shelter, or couch-surfing.) Yes   Are you worried about losing your housing? No   Lack of transportation? No   Unable to get utilities (heat,electricity)? No         3/6/2025   Fall Risk   Fallen 2 or more times in the past year? No   Trouble with walking or balance? No          3/6/2025   Activities of Daily Living- Home Safety   Needs help with the following daily activites None of the above   Safety concerns in the home None of the above         3/6/2025   Dental   Dentist two times every year? Yes         3/6/2025   Hearing Screening   Hearing concerns? None of the above         3/6/2025   Driving Risk Screening   Patient/family members have concerns about driving No         3/6/2025   General Alertness/Fatigue Screening   Have you been more tired than usual lately? No         3/6/2025   Urinary Incontinence Screening   Bothered by leaking urine in past 6 months No            Today's PHQ-2 Score:       3/6/2025     2:53 PM   PHQ-2 ( 1999 [employer])   Q1: Little interest or pleasure in doing things 0   Q2: Feeling down, depressed or hopeless 0   PHQ-2 Score 0    Q1: Little interest or pleasure in doing things Not at all   Q2: Feeling down, depressed or hopeless Not at all   PHQ-2 Score 0       Patient-reported           3/6/2025   Substance Use   Alcohol more than  3/day or more than 7/wk No   Do you have a current opioid prescription? No   How severe/bad is pain from 1 to 10? 0/10 (No Pain)   Do you use any other substances recreationally? No     Social History     Tobacco Use    Smoking status: Never    Smokeless tobacco: Never   Substance Use Topics    Alcohol use: Not Currently    Drug use: Not Currently           3/6/2025   AAA Screening   Family history of Abdominal Aortic Aneurysm (AAA)? No   ASCVD Risk   The ASCVD Risk score (Nona DK, et al., 2019) failed to calculate for the following reasons:    Cannot find a previous HDL lab    Cannot find a previous total cholesterol lab            Reviewed and updated as needed this visit by Provider                    No past medical history on file.  Past Surgical History:   Procedure Laterality Date    NV TOOTH EXTRACTION W/FORCEP       BP Readings from Last 3 Encounters:   03/06/25 (!) 164/73   06/09/21 130/72   06/03/21 (!) 132/90    Wt Readings from Last 3 Encounters:   03/06/25 86.4 kg (190 lb 6.4 oz)   06/03/21 79.7 kg (175 lb 12.8 oz)   04/14/21 77.6 kg (171 lb)                  Patient Active Problem List   Diagnosis    Moderate persistent asthma    Allergic rhinitis     Past Surgical History:   Procedure Laterality Date    NV TOOTH EXTRACTION W/FORCEP         Social History     Tobacco Use    Smoking status: Never    Smokeless tobacco: Never   Substance Use Topics    Alcohol use: Not Currently     Family History   Problem Relation Age of Onset    Chronic Obstructive Pulmonary Disease Father     Cancer Maternal Grandmother     Coronary Artery Disease No family hx of          Current Outpatient Medications   Medication Sig Dispense Refill    ADVAIR DISKUS 250-50 MCG/DOSE inhaler Inhale 1 puff into the lungs daily      albuterol (PROAIR HFA/PROVENTIL HFA/VENTOLIN HFA) 108 (90 Base) MCG/ACT inhaler Inhale 1-2 puffs into the lungs every 4 hours as needed      betamethasone dipropionate (DIPROSONE) 0.05 % external  "cream Apply topically 2 times daily Apply to AA on foot bid for 14 days (Patient not taking: Reported on 3/6/2025) 30 g 0    terbinafine (LAMISIL) 1 % external cream Apply topically 2 times daily (Patient not taking: Reported on 3/6/2025)       Allergies   Allergen Reactions    Dust Mites     Pollen Extract      Current providers sharing in care for this patient include:  Patient Care Team:  Jaydon Almonte MD as PCP - General (Internal Medicine)    The following health maintenance items are reviewed in Epic and correct as of today:  Health Maintenance   Topic Date Due    ANNUAL REVIEW OF HM ORDERS  Never done    ASTHMA ACTION PLAN  Never done    ADVANCE CARE PLANNING  Never done    GLUCOSE  Never done    COLORECTAL CANCER SCREENING  Never done    HEPATITIS C SCREENING  Never done    DTAP/TDAP/TD IMMUNIZATION (1 - Tdap) Never done    LIPID  Never done    ZOSTER IMMUNIZATION (1 of 2) Never done    RSV VACCINE (1 - Risk 60-74 years 1-dose series) Never done    MEDICARE ANNUAL WELLNESS VISIT  Never done    Pneumococcal Vaccine: 50+ Years (2 of 2 - PCV) 06/15/2021    COVID-19 Vaccine (4 - 2024-25 season) 09/01/2024    ASTHMA CONTROL TEST  09/06/2025    FALL RISK ASSESSMENT  03/06/2026    PHQ-2 (once per calendar year)  Completed    INFLUENZA VACCINE  Completed    HPV IMMUNIZATION  Aged Out    MENINGITIS IMMUNIZATION  Aged Out       A 10 organ systems ROS is negative other than any pertinent positives or negatives previously stated.      Objective    Exam  BP (!) 164/73 (BP Location: Left arm, Patient Position: Sitting, Cuff Size: Adult Regular)   Pulse 78   Temp 98.1  F (36.7  C) (Temporal)   Resp 16   Ht 1.74 m (5' 8.5\")   Wt 86.4 kg (190 lb 6.4 oz)   SpO2 95%   BMI 28.53 kg/m     Estimated body mass index is 28.53 kg/m  as calculated from the following:    Height as of this encounter: 1.74 m (5' 8.5\").    Weight as of this encounter: 86.4 kg (190 lb 6.4 oz).    Physical Exam  GENERAL: alert and no " distress  EYES: Eyes grossly normal to inspection, PERRL and conjunctivae and sclerae normal  HENT: ear canals and TM's normal, nose and mouth without ulcers or lesions  NECK: no adenopathy, no asymmetry, masses, or scars  RESP: lungs clear to auscultation - no rales, rhonchi or wheezes  CV: regular rate and rhythm, normal S1 S2, no S3 or S4, no murmur, click or rub, no peripheral edema  ABDOMEN: soft, nontender, no hepatosplenomegaly, no masses and bowel sounds normal  MS: no gross musculoskeletal defects noted, no edema  SKIN: no suspicious lesions or rashes  NEURO: Normal strength and tone, mentation intact and speech normal  PSYCH: mentation appears normal, affect normal/bright        3/6/2025   Mini Cog   Mini-Cog Not Completed (choose reason) Patient declines              Signed Electronically by: Jaydon Almonte MD

## 2025-03-07 ENCOUNTER — TELEPHONE (OUTPATIENT)
Dept: FAMILY MEDICINE | Facility: CLINIC | Age: 70
End: 2025-03-07
Payer: MEDICARE

## 2025-03-07 DIAGNOSIS — R97.20 ELEVATED PROSTATE SPECIFIC ANTIGEN (PSA): Primary | ICD-10-CM

## 2025-03-07 DIAGNOSIS — R10.84 ABDOMINAL PAIN, GENERALIZED: ICD-10-CM

## 2025-03-07 NOTE — TELEPHONE ENCOUNTER
To PCP:    Lab results reviewed with Patient.    Patient scheduled for a lab recheck for PSA on the following:    Appointments in Next Year      Apr 07, 2025 11:00 AM  LAB with CS LAB  [facility] ([facility] [place] ) [phone]          Patient wondering what it would entail if he has another increased PSA screening and has to see a urologist. He would like to know what they would do if he sees them. He is nervous regarding his recent findings and is looking for some further explanation.    Thank you.    Steve Blunt RN  [facility] Internal Medicine

## 2025-03-07 NOTE — TELEPHONE ENCOUNTER
Triage outreach    Attempt number 1    Left message to call back at [phone].    DONNY Herbert  [facility] Triage     Please read the following for Patient from Dr. Almonte (also see note below from Dr. Almonte:  The following letter pertains to your most recent diagnostic tests:     -Liver and gallbladder tests are normal for you. (ALT,AST, Alk phos, bilirubin), kidney function is normal for you (Creatinine, GFR), Sodium is normal, Potassium is normal for you, Calcium is normal for you, the Glucose (blood sugar) is elevated but it is not in the diabetic range (diabetes is defined as a fasting blood sugar reading greater than 126 on two separate occassions).       -Your cholesterol panel looks healthy with the exception of elevated triglycerides.  Reducing carbohydrates (sugar and starches)  your diet, losing weight and consuming less alcohol can improve your triglyceride levels.     -The prostate cancer screening test PSA is slightly elevated.  This should be rechecked in 1-2 months to see if it is increasing or decreasing.  If the second check remains high, I would recommend a consultation with a prostate specialist (urologist) for further evaluation.  Schedule a lab appointment in our clinic ot recheck the PSA in 1-2 months.       -Your hepatitis C screening test is negative.  You do not have hepatitis C.     -Your hemoglobin A1c test which averages your blood sugars over the last 3 months returned indicating very mild prediabetes.       Prediabetes is a condition in which blood sugar levels are higher than normal but not high enough to be diagnosed as diabetes. If left untreated, prediabetes can progress to type 2 diabetes, a serious condition that can lead to many health problems.       The most effective way to prevent prediabetes from progressing to diabetes is to make healthy lifestyle changes. This includes eating a healthy diet that is low in sugar and starches (carbohydrates).  It is particularly  important to avoid foods that contain added sugars such as high fructose corn syrup.  Soda pop, juices, sport drinks, candies and sweets commonly contain added sugars.     In addition to changing your diet, increasing physical activity can prevent progression to diabetes. Regular exercise can help improve insulin sensitivity and reduce blood sugar levels. Aim for at 150 minutes of moderate-intensity physical activity each week.  This could include brisk walking, cycling, or swimming.     Losing weight is an effective way to prevent prediabetes from progressing to diabetes. Even a modest weight loss of 5-10% of you current body weight can make a significant difference in reducing your risk of developing type 2 diabetes.     A combination of healthy eating and regular exercise can help you lose weight and reduce your risk of developing type 2 diabetes.     -Your complete blood counts including your hemoglobin returned normal for you.             Bottom line:  The labs look OK except for the PSA being abnormal and the blood sugar problem.     Return to the lab to recheck the PSA in 1-2 months.  You should schedule a lab appointment for that purpose.       Work on your diet an activity to improve blood sugar and we should recheck the hemoglobin A1c test in one year.          Follow up:  Lab appointment 1-2 months to recheck the PSA        Sincerely,     Dr. Almonte

## 2025-03-07 NOTE — TELEPHONE ENCOUNTER
This can wait until Monday, but if he is having stomach aches and constipation, he should be reminded to schedule the colonoscopy for which he is due and was referred for at his visit.  If symptoms are severe, he could schedule a CT scan of the abdomen and pelvis which can likely be scheduled more promptly than the colonoscopy.  I ordered the test.  Finally, if urinary frequency is a severe problem, he could schedule a follow up telephone or video appointment with me to discuss drug treatments for BPH.  That visit would also give us a good opportunity to discuss his home blood pressure readings which he was asked to measure when we met this week as well.  Please help him schedule if he wants that follow up.

## 2025-03-07 NOTE — TELEPHONE ENCOUNTER
Triage Patient Outreach    Attempt # 1    Was call answered?  No.  Left voicemail to return call to [facility] at [facility]. Upon callback, relay provider message below.     Kirstin Robles RN

## 2025-03-07 NOTE — TELEPHONE ENCOUNTER
Patient given message from Dr. Almonte.    Patient states that he has been having urinary frequency, constipation, difficulty getting stool out and stomach aches. Patient states that stomach aches could be due to bad food. States that he does drink a lot     Patient would like to get blood test sooner. Scheduled patient for lab only in 1 week.  Aimee Chapman RN

## 2025-03-07 NOTE — TELEPHONE ENCOUNTER
The PSA can be falsely positive fairly frequently particularly when it is only mildly elevated.   Sometimes an MRI or other blood tests are necessary to distinguish between a worrisome finding or a false positive test.  The urologists are expert at determining the best plan for that.  If he is worried, he can recheck the PSA in our lab sooner.   He could move the lab appointment up to 1-2 weeks from now.  Please help him do so if he wants to do that.  Also, if he has more questions for me, he is welcome to schedule a telephone or video appointment to get his questions answered.

## 2025-03-07 NOTE — TELEPHONE ENCOUNTER
----- Message from Jaydon Almonte sent at 3/7/2025  2:21 PM CST -----  Please call Robby and read him my result note and make sure he schedules a lab appointment in 1-2 months to recheck the PSA level.  Please help him schedule.

## 2025-03-10 NOTE — TELEPHONE ENCOUNTER
Patient Contact  Attempt # 2     Was call answered?  No.  Left message on voicemail with information to call triage back.    Subjective   Patient ID: Khris is a 56 year old male.    Chief Complaint   Patient presents with    Follow-up     Blood clot (2)   Patient seen by orthopedics.  Status post right big toe metatarsophalangeal cheilectomy surgery on 4/5/24.  Developed right lower extremity calf pain several days later postop.  4/10/24 Venous Doppler done at outside facility showing thrombus in the posterior tibial and also peroneal veins. [facility] [place] called and report received during today's visit.  Patient was started on Xarelto 15 mg by orthopedics.  No family history of DVT or blood clot.  No history of any trauma or injury to the leg.  No period of immobilization.  Denies any shortness of breath or chest pain.      HPI  Review of Systems   Constitutional:  Positive for activity change. Negative for chills, diaphoresis and fever.   Respiratory:  Negative for chest tightness and shortness of breath.    Cardiovascular:  Positive for leg swelling. Negative for chest pain and palpitations.   Gastrointestinal:  Negative for abdominal pain and nausea.   Musculoskeletal:  Positive for arthralgias and gait problem.        Postop right metatarsophalangeal joint surgery.  Swelling in the right distal leg   Neurological:  Negative for weakness and numbness.   Hematological:  Negative for adenopathy.       ALLERGIES:  No Known Allergies  Current Outpatient Medications   Medication Sig    Xarelto 15 MG Tab [None received]    zolpidem (AMBIEN CR) 12.5 MG CR tablet TAKE 1 TABLET BY MOUTH EVERY NIGHT AS NEEDED FOR SLEEP     No current facility-administered medications for this visit.     Past Medical History:   Diagnosis Date    Agatston coronary artery calcium score less than 100 02/2020    Score 0    Family history of ischemic heart disease     Insomnia     lunesta     PVC (premature ventricular contraction)      Past Surgical History:   Procedure Laterality Date    Colonoscopy  2018    neg . 10 yr   dr rodriguez .     Tonsillectomy and adenoidectomy      Vasectomy      [place] tooth extraction       Family History   Problem Relation Age of Onset    Osteoarthritis Mother         back     Osteoarthritis Father     Cataracts Father     Coronary Artery Disease Father         cabg x2 , mi     Hypertension Father     Cancer Father         multiple  myeloma     Patient is unaware of any medical problems Sister     Heart Maternal Grandmother         rheumatic??     Patient is unaware of any medical problems Maternal Grandfather     Patient is unaware of any medical problems Paternal Grandmother     Stroke/TIA Paternal Grandfather     Patient is unaware of any medical problems Daughter     Patient is unaware of any medical problems Son     Patient is unaware of any medical problems Son      Social History     Substance and Sexual Activity   Alcohol Use Yes    Comment: Socc/Weekend     Social History     Tobacco Use   Smoking Status Never   Smokeless Tobacco Never       Objective   Visit Vitals  /76   Pulse 93   Temp 98.8 °F (37.1 °C) (Temporal)   Ht 6' 1\" (1.854 m)   Wt 89.7 kg (197 lb 11.2 oz)   SpO2 98%   BMI 26.08 kg/m²     Physical Exam  Vitals reviewed.   Constitutional:       General: He is not in acute distress.     Appearance: Normal appearance. He is well-developed.   HENT:      Head: Normocephalic and atraumatic.   Neck:      Thyroid: No thyromegaly.      Vascular: No JVD.   Cardiovascular:      Rate and Rhythm: Normal rate and regular rhythm.      Heart sounds: Normal heart sounds.   Pulmonary:      Effort: Pulmonary effort is normal. No respiratory distress.   Musculoskeletal:         General: Swelling present.      Comments: Postop right foot in surgical shoe and Ace wrap.  Right leg size slightly larger than left.  +1 right pretibial edema.  No palpable cords.  Homans negative.   Skin:     Findings: No bruising or erythema.   Neurological:      Mental Status: He is alert and oriented to person, place, and time.       Sensory: No sensory deficit.      Motor: No weakness.   Psychiatric:         Thought Content: Thought content normal.           No visits with results within 1 Month(s) from this visit.   Latest known visit with results is:   External Lab on 03/08/2024   Component Date Value Ref Range Status    Lab Result 03/08/2024 See scan for additional information   Final    CBC         Assessment and Plan  1. DVT, lower extremity, distal, acute, right (CMD)  Status post right foot first metatarsal phalangeal joint surgery with postoperative posterior tibial and peroneal vein DVT.  Currently on Xarelto started by orthopedics  Continue with Xarelto 15 mg p.o. twice daily for 3 weeks then change to 20 mg/day maintenance dose..  Patient will call office for new prescription at that time.  Patient scheduled appointment in May for appointment at which time we will reevaluate the leg and consider repeat venous Dopplers .  Recommend leg elevation, warm compresses, may do limited none exertional activities around the house and walking.    Patient had questions regarding traveling on airplane to [place] to  his daughter then driving home over 2 days.  Patient was advised this was not advised and may perpetuate the thrombus.    Medication changes: No     Follow-up in as scheduled    Greater than 50% of the 20 minute appointment was spent counseling patient regarding disease management, and treatment plan.    Pedro Cortes MD  04/12/24  4:43 PM

## 2025-03-10 NOTE — TELEPHONE ENCOUNTER
Pt called back and writer relayed provider message below to patient. Per pt, he isn't having severe problems. Pt states he isn't having a lot of symptoms that would say he is having severe health problems. Pt wants to cut down sugar and see if this helps his health. Pt states he hasn't purchased a BP cuff yet so he isn't taking his BP at home currently.     Kirstin Robles RN

## 2025-03-14 NOTE — TELEPHONE ENCOUNTER
Please remind him that the home blood pressure readings are very important for his long-term health and make sure he knows to schedule his colonoscopy and to return to the lab for the PSA recheck

## 2025-03-17 ENCOUNTER — NURSE TRIAGE (OUTPATIENT)
Dept: FAMILY MEDICINE | Facility: CLINIC | Age: 70
End: 2025-03-17
Payer: MEDICARE

## 2025-03-17 NOTE — TELEPHONE ENCOUNTER
Triage spoke to pt and relayed provider message below - pt verbalized understanding, and reported change in condition -- see 3/17 TE.    Juana Mota RN

## 2025-03-17 NOTE — TELEPHONE ENCOUNTER
"Dr. Almonte - please advise.    Nurse Triage SBAR    Is this a 2nd Level Triage? YES, LICENSED PRACTITIONER REVIEW IS REQUIRED    Situation: Pt calling to report concerns about ongoing gut irritation when eating and after eating    Background: Pt last seen 3/6 OV for AWV - noticed increase in gut changes/discomfort since visit; pt has been eating at restaurants more recently than before and has known trouble processing dairy foods    Assessment: Pt reports mild abdominal discomfort when eating in lower central quadrant of abdomen, quickly resolves with BM or void; pt also reports increasing frequency of voiding and changes/increase in urge to have BM; per pt, no known exposure to URI or viruses, but pt work and locations naturally put pt at risk of exposures; pt also expressed concerns about 3/14 PSA lab and waiting results    Protocol Recommended Disposition:   See in Office Today or Tomorrow    Recommendation: Triage educated pt on basics regarding food processing through GI and , including mentioning that stress/anxiety manifests as mild abdominal pains as well. Pt scheduled with CLIFTON Pickard on April 28, and protocol would call for pt to be seen in office TODAY OR TOMORROW - thoughts on pt coming in to be seen?    Routed to provider    Does the patient meet one of the following criteria for ADS visit consideration? 16+ years old, with an [facility] PCP     TIP  Providers, please consider if this condition is appropriate for management at one of our Acute and Diagnostic Services sites.     If patient is a good candidate, please use dotphrase <dot>triageresponse and select Refer to ADS to document.    Juana Mota RN  ~~~~~~~~~~~~~~~~~~~~~~~~~~  1. LOCATION: \"Where does it hurt?\"       Lower middle quadrant  2. RADIATION: \"Does the pain shoot anywhere else?\" (e.g., chest, back)      Denies  3. ONSET: \"When did the pain begin?\" (Minutes, hours or days ago)       Beginning of March '25  4. SUDDEN: \"Gradual or sudden " "onset?\"      Gradual, when eating  5. PATTERN \"Does the pain come and go, or is it constant?\"      Intermittent - achy, irritated  6. SEVERITY: \"How bad is the pain?\"  (e.g., Scale 1-10; mild, moderate, or severe)      Mild, 'not painful' per pt  7. RECURRENT SYMPTOM: \"Have you ever had this type of stomach pain before?\" If Yes, ask: \"When was the last time?\" and \"What happened that time?\"       Hx allergies/asthma, recent URI (no antibiotics ordered/taken)   8. CAUSE: \"What do you think is causing the stomach pain?\"      Constipation/gas? Retaining urine? Restaurant eating? Change in fullness sensation?  9. RELIEVING/AGGRAVATING FACTORS: \"What makes it better or worse?\" (e.g., antacids, bending or twisting motion, bowel movement)      Having BM or void - drops pain back to nearly NONE  10. OTHER SYMPTOMS: \"Do you have any other symptoms?\" (e.g., back pain, diarrhea, fever, urination pain, vomiting)        Intermittent diarrhea, increasing sense of have BM    Reason for Disposition   MILD pain (e.g., does not interfere with normal activities) and pain comes and goes (cramps) lasts > 48 hours  (Exception: This same abdominal pain is a chronic symptom recurrent or ongoing AND present > 4 weeks.)    Additional Information   Negative: Passed out (e.g., fainted, lost consciousness, blacked out and was not responding)   Negative: Shock suspected (e.g., cold/pale/clammy skin, too weak to stand, low BP, rapid pulse)   Negative: Sounds like a life-threatening emergency to the triager   Negative: Followed an abdomen (stomach) injury   Negative: Chest pain   Negative: Pain is mainly in upper abdomen (if needed ask: 'is it mainly above the belly button?')   Negative: Abdomen bloating or swelling are main symptoms   Negative: SEVERE abdominal pain (e.g., excruciating)   Negative: Vomiting red blood or black (coffee ground) material   Negative: Blood in bowel movements  (Exception: Blood on surface of BM with constipation.)   " Negative: Black or tarry bowel movements  (Exception: Chronic-unchanged black-grey BMs AND is taking iron pills or Pepto-Bismol.)   Negative: Unable to urinate (or only a few drops) and bladder feels very full   Negative: Pain in scrotum persists > 1 hour   Negative: MILD TO MODERATE constant pain lasting > 2 hours   Negative: MILD TO MODERATE constant pain lasting > 2 hours, and age > 60 years   Negative: Vomiting bile (green color)   Negative: Patient sounds very sick or weak to the triager   Negative: Vomiting and abdomen looks much more swollen than usual   Negative: White of the eyes have turned yellow (i.e., jaundice)   Negative: Blood in urine (red, pink, or tea-colored)   Negative: Fever > 103 F (39.4 C)   Negative: Fever > 101 F (38.3 C) and over 60 years of age   Negative: Fever > 100 F (37.8 C) and has diabetes mellitus or a weak immune system (e.g., HIV positive, cancer chemotherapy, organ transplant, splenectomy, chronic steroids)   Negative: Fever > 100 F (37.8 C) and bedridden (e.g., CVA, chronic illness, recovering from surgery)   Negative: MODERATE pain (e.g., interferes with normal activities) that comes and goes (cramps) lasts > 24 hours  (Exception: Pain with Vomiting or Diarrhea - see that Protocol.)   Negative: Patient wants to be seen    Protocols used: Abdominal Pain - Male-A-[place]

## 2025-03-17 NOTE — TELEPHONE ENCOUNTER
Given his new symptoms and his level of concern, and also the concern that I have about his blood pressure, I think a virtual follow-up visit would be indicated, can you please help him schedule?  If he prefers an in person visit, he could schedule an in person visit in a virtual slot or same-day slot if needed.

## 2025-03-17 NOTE — TELEPHONE ENCOUNTER
Patient Contact     Attempt # 1     Was call answered?  no.  Left message on voicemail with information to call triage back.     Upon call back, please review Provider's message below with Patient.    Kanwal EDWARDS,  Triage RN  [facility]

## 2025-03-17 NOTE — TELEPHONE ENCOUNTER
Left VM asking to call triage back. On call back, please help schedule VV or OV to follow up on symptoms. See providers message below.

## 2025-03-18 NOTE — TELEPHONE ENCOUNTER
Patient called the clinic and stated that he missed a called. Informed him that we did not call him today and he has already called the clinic back. Patient stated understanding and had no further questions.     Lillian DEAL RN  [facility] Triage Team

## 2025-03-20 ENCOUNTER — OFFICE VISIT (OUTPATIENT)
Dept: FAMILY MEDICINE | Facility: CLINIC | Age: 70
End: 2025-03-20
Payer: MEDICARE

## 2025-03-20 VITALS
WEIGHT: 186.1 LBS | HEART RATE: 84 BPM | OXYGEN SATURATION: 97 % | TEMPERATURE: 98 F | HEIGHT: 69 IN | RESPIRATION RATE: 18 BRPM | BODY MASS INDEX: 27.56 KG/M2 | DIASTOLIC BLOOD PRESSURE: 99 MMHG | SYSTOLIC BLOOD PRESSURE: 152 MMHG

## 2025-03-20 DIAGNOSIS — R19.7 DIARRHEA, UNSPECIFIED TYPE: ICD-10-CM

## 2025-03-20 DIAGNOSIS — I10 BENIGN ESSENTIAL HYPERTENSION: Primary | ICD-10-CM

## 2025-03-20 DIAGNOSIS — R10.84 ABDOMINAL PAIN, GENERALIZED: ICD-10-CM

## 2025-03-20 RX ORDER — AMLODIPINE AND VALSARTAN 5; 160 MG/1; MG/1
1 TABLET ORAL DAILY
Qty: 90 TABLET | Refills: 1 | Status: SHIPPED | OUTPATIENT
Start: 2025-03-20

## 2025-03-20 ASSESSMENT — PAIN SCALES - GENERAL: PAINLEVEL_OUTOF10: NO PAIN (0)

## 2025-03-20 NOTE — PATIENT INSTRUCTIONS
"Your blood pressure remains too high.  I think you need to take blood pressure medication to lower your risk for complications of high blood pressure which include stroke, heart attack and kidney disease.  I know you want to take some home blood pressure readings to confirm your blood pressure problem.      If your home blood pressure readings are similar to your readings here in our office (greater than 140/90), then you should start the Exforge (amlodipine-valsartan) medication that I prescribed.      If you start the medication, it would be very important for you to return to our lab to check your blood tests after you have been taking the medication for 2-4 weeks.  You should schedule a lab appointment for that purpose.  You can also schedule an appointment for a medical assistant to recheck your blood pressure at that time as well.      ------------------  Since you are having a lot of stomach upset and diarrhea, I recommend taking home a kit for stool testing for potential infections that may be making you feel poorly.    Also, it would be very important to schedule the colonoscopy that was ordered at your last visit to make sure you are up to date with colon cancer screening.  You should call [facility] to schedule this test [phone].    ---------------------  For your gas problems you could try:    1.  Beano diet supplement as directed to help breakdown difficult to digest foods that feed gas producing bacteria in your digestive tract    2.  A probiotic supplement such Culturelle, Florastor or Align as directed to help repopulate your digestive tract with helpful \"good\" bacteria that help you digest difficult to digest foods without producing gas as a byproduct     3.  For terrible bloating or gas distention you could try over the counter Simethicone containing products such as Gas-X which helps minimize distention related to excessive gas     "

## 2025-03-20 NOTE — PROGRESS NOTES
Assessment & Plan     Benign essential hypertension  His blood pressure is elevated in clinic again today and as such I think he has high blood pressure that needs treatment.  He disputes this and would like to check some home blood pressures before starting medications.  He is certainly welcome to do so.  However, if his home blood pressure readings are greater than 140/90, he should start the Exforge as prescribed.  I discussed the potential risks and side effects of the medication.  I discussed that if and when he starts the medication, it would be necessary for him to return in 2 to 4 weeks for a medical assistant blood pressure check and labs as below.  We discussed the risks of not treating high blood pressure including elevated risk for stroke, heart attack and kidney disease.  - amLODIPine-valsartan (EXFORGE) 5-160 MG tablet; Take 1 tablet by mouth daily.  - Potassium; Future  - Creatinine; Future    Abdominal pain, generalized/  Diarrhea, unspecified type  Unclear etiology although his symptoms sound consistent with a resolving viral enteritis  We can check stool tests to see if we can make a definitive diagnosis  It is not uncommon to have several weeks of postinfectious irritable bowel syndrome type symptoms following an infectious enteritis which might explain his recent symptoms  He states today that he is actually feeling much better  Given the nature of his symptoms, I think excluding celiac disease when he comes in for follow-up labs would be helpful with a TTG test  -Infection that is treatable is identified, we will certainly initiate treatment  His abdominal examination is reassuring this evening  - Tissue transglutaminase briseyda IgA and IgG; Future  - C. difficile Toxin B PCR with reflex to C. difficile EIA; Future  - Enteric Bacteria and Virus Panel by [facility] Stool; Future            FUTURE APPOINTMENTS:       -Medical assistant blood pressure check and lab appointment 2 to 4 weeks after starting  "Exforge for blood pressure    Subjective   Robby is a 69 year old, presenting for the following health issues:  Follow Up (GI concerns, nutrition referral,recent cold symptom), Referral, and Triage        3/20/2025     5:22 PM   Additional Questions   Roomed by Doris   Accompanied by Not applicable, by themselves     History of Present Illness       Reason for visit:  GI concerns, nutrition referral,triage   He is taking medications regularly.            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  He lives alone with his cat  He was seen for preventive exam a few weeks ago  After that visit he developed abdominal pain, bloating, diarrhea and intermittent constipation  He also describes suprapubic pain and pain in his inguinal areas that has since resolved  His most recent stools have been formed  He has not had any weight loss  He had symptoms that he thought might be consistent with a viral infection that have since passed  He denied any blood in his stools  He has no family history of celiac disease, or inflammatory bowel disease that he knows of  He is in the process of scheduling his colonoscopy for colon cancer screening  He has not had a chance to check his blood pressure at home as he is advised to do so after our preventive exam  Describes a long history of intermittent gas problems  He also admits to financial stressors and stressors of living alone  He finds it difficult to meet people in the city  He also feels like he is not welcome in grocery store such as [employer] as he perceives that the staff at grocery stores are sometimes suspicious of him  Therefore, he buys most of his food at gas stations or through delivery services which limits his ability to obtain fresh healthy foods      Objective    BP (!) 169/104 (BP Location: Left arm, Patient Position: Sitting, Cuff Size: Adult Large)   Pulse 78   Temp 98  F (36.7  C) (Temporal)   Resp 18   Ht 1.74 m (5' 8.5\")   Wt 84.4 kg (186 lb 1.6 oz)   " SpO2 97%   BMI 27.88 kg/m    Body mass index is 27.88 kg/m .  Physical Exam   GENERAL: alert and no distress  ABDOMEN: soft, nontender, no hepatosplenomegaly, no masses and bowel sounds normal  : No inguinal hernias or testicular masses  RECTAL: normal sphincter tone, no rectal masses, prostate normal size, smooth, nontender without nodules or masses  SKIN: no suspicious lesions or rashes  NEURO: Normal strength and tone, sensory exam grossly normal, and mentation intact  PSYCH: Speech is tangential and pressured at times, his thought content is mildly disorganized at times, he is well-groomed            Signed Electronically by: Jaydon Almonte MD      Wt Readings from Last 4 Encounters:   03/20/25 84.4 kg (186 lb 1.6 oz)   03/06/25 86.4 kg (190 lb 6.4 oz)   06/03/21 79.7 kg (175 lb 12.8 oz)   04/14/21 77.6 kg (171 lb)     BP Readings from Last 6 Encounters:   03/20/25 (!) 152/99   03/06/25 (!) 154/76   06/09/21 130/72   06/03/21 (!) 132/90   05/26/21 122/78   04/14/21 139/86

## 2025-03-22 LAB
TTG IGA SER-ACNC: 0.9 U/ML
TTG IGG SER-ACNC: <0.6 U/ML

## 2025-03-24 ENCOUNTER — APPOINTMENT (OUTPATIENT)
Dept: LAB | Facility: CLINIC | Age: 70
End: 2025-03-24
Payer: MEDICARE

## 2025-03-24 LAB

## 2025-04-16 ENCOUNTER — TELEPHONE (OUTPATIENT)
Dept: FAMILY MEDICINE | Facility: CLINIC | Age: 70
End: 2025-04-16
Payer: MEDICARE

## 2025-04-16 NOTE — TELEPHONE ENCOUNTER
Patient Contact     Attempt # 1     Was call answered?  no.  Left message on voicemail with information to call triage back.     Upon call back, please review Provider's note with Patient.    Kanwal EDWARDS,  Triage RN  [facility]

## 2025-04-16 NOTE — TELEPHONE ENCOUNTER
"Patient is concerned that his Advair will interact badly with the antihypertensive medication and will \"alter his heart rhythm\". Patient states he doesn't think Dr. Almonte is reviewing medications that are prescribed by other Doctors, and it may not be safe to start this new drug so he hasn't yet.    Patient wanted Dr. Almonte to review this concern before discussing anything further.    Please advise, thank you.    Steve Blunt RN  [facility]    "

## 2025-04-16 NOTE — TELEPHONE ENCOUNTER
I continue to recommend that he take the medications prescribed to address his uncontrolled high blood pressure.  He should be aware that untreated high blood pressure is a risk factor for stroke, permanent neurological damage from stroke, heart attack and kidney disease.  If he has questions or concerns about starting the drug, he is more than welcome to schedule a virtual visit with me to get his questions answered.

## 2025-04-16 NOTE — TELEPHONE ENCOUNTER
General Call    Contacts       Contact Date/Time Type Contact Phone/Fax    04/16/2025 09:47 AM CDT Phone (Incoming) Robby Arriaga (Self) [phone] (H)          Reason for Call:  Patient called back to follow up with provider with his BP reading. He states that he is on the board line of high .9. He would consider taking BP medication per provider recommendation.     What are your questions or concerns:  He believes the high BP due to political, etc.     Date of last appointment with provider: 03/20/25    Okay to leave a detailed message?: Yes at Home number on file [phone] (home)

## 2025-04-16 NOTE — TELEPHONE ENCOUNTER
I do not think that the Exforge will interact poorly with any of his asthma medications.  Specifically, I do not think there are any adverse interactions with Advair or albuterol.  I would recommend proceeding with starting those medications.  However, if he has questions or concerns, he is welcome to schedule a virtual visit to discuss.

## 2025-04-16 NOTE — TELEPHONE ENCOUNTER
It would be very helpful to know if these blood pressure readings were measured while Malik was taking the Exforge antihypertensive medication that was prescribed on March 20?  If he has not started that medication, his blood pressure is too high and he should start that medication and remeasure his blood pressure after he has been taking the medication for 1 week.  He should report back to us if his blood pressures remain greater than 130/80 upon starting that medication.  Also, it is important that he have lab tests drawn after he has been taking the medication for 1 to 3 weeks.  Please help him schedule a lab appointment for that purpose.

## 2025-04-16 NOTE — TELEPHONE ENCOUNTER
Patient states he does not like to use medications, he believes more in eating right and reducing stress, patient also states that he feels like the [facility] is too busy and stress inducing which contributed it to his high blood pressure     He will continue inhalers     He is going to just monitor the BP for now and work on lifestyle changes   He is also requesting that [place] open a blood pressure clinic on helping people relax and be mindful      Patient states he told 23 BP readings and they averaged 140/90 over the week but he is going to make lifestyle changes and let us know if gets higher

## 2025-04-16 NOTE — TELEPHONE ENCOUNTER
Spoke with patient, he have been check his blood pressure at home.    04/15/2025  BP: 152/98 and 159/95.

## 2025-04-17 NOTE — TELEPHONE ENCOUNTER
Pt was called with providers message. States he cares of his BP and has been monitoring BP with Omron monitor he bought at [employer].  He declines to schedule VV to discuss medication. States high BP get elevated when he calls this busy clinic. Triage advised he can always call clinic to schedule appt with PCP to discuss concerns.

## 2025-04-28 ENCOUNTER — TRANSFERRED RECORDS (OUTPATIENT)
Dept: GASTROENTEROLOGY | Facility: CLINIC | Age: 70
End: 2025-04-28
Payer: MEDICARE

## 2025-04-30 NOTE — PROCEDURES
[facility]   [address] [place] [address], [address], [place] [address]     Patient Name: Robby Arriaga  Gender:  Male  Exam Date: 04/28/2025 Visit Number:  [id number]  Age: 69 Years YOB: 1955  Attending MD: Lázaro Adler MD Medical Record#:  [id number]    Procedure: Colonoscopy   Indications: Colorectal cancer screening      Referring MD: Referral Self  Primary MD:      Jaydon Almonte MD  Medications: Admitting Medications:   0.9% Normal Saline at TKO   Intra Procedure Medications:   Patient received monitored anesthesia care.     Complications: No immediate complications  ______________________________________________________________________________  Procedure:   No heart and lung exam performed due to no conscious sedation used during procedure.  The risks and benefits of the procedure were explained to the patient.After obtaining informed consent, the patient received monitored anesthesia care and I passed the scope   without difficulty via the rectum to the ileum.  The appendiceal orifice and ic valve were identified.  The scope was retroflexed during the examination  The quality of the prep was good  (Miralax/Gatorade/2 tablets Bisacodyl/Magnesium Citrate).    This was a complete examination throughout the entire colon.    Findings:    Normal finding.  Location - ileum.    Polyp location: ascending colon.  Quantity: 1.  Size: 2 mm.  Polyp shape:  sessile.         Maneuver: polypectomy was performed with a cold snare.       Removal:  complete.  Retrieval: complete.  Bleeding: none.    Polyp location: descending colon.  Quantity: 1.  Size: 7 mm.  Polyp shape: sessile.         Maneuver: polypectomy was performed with a  cold snare.       Removal: complete.  Retrieval: complete.  Bleeding: none.    Polyp location: sigmoid.  Quantity: 1.  Size: 3 mm.   Polyp shape: sessile.         Maneuver: polypectomy was performed with a cold snare.       Removal: complete.  Retrieval: complete.   Bleeding: none.      Diverticulosis.  Location: - descending colon - sigmoid.      Size:  small.    Quantity:  several.    Hemorrhoids.  Internal hemorrhoids without bleeding.  Remainder of the exam is normal.    Impression:  Screening Colonoscopy  Colorectal polyp detected on colonoscopy  Diverticulosis    Preliminary Plan:  The patient and their physician will receive a copy of the pathology report as well as pathology-based recommendations for future screening or surveillance.  Pathology Results:  A: COLON, ASCENDING, POLYP:           1. Tubular adenoma           2. Negative for high grade dysplasia           3. Per the colonoscopy report:               a. Polyp size: 2 mm               b. Resection: Complete               c. Retrieval: Complete      B: COLON, DESCENDING, POLYP:           1. Inflammatory polyp           2. Negative for dysplasia      C: COLON, SIGMOID, POLYP:           1. Hyperplastic polyp      MICROSCOPIC  A: Performed   B: Performed   C: Performed     Electronically signed by: TORIBIO Casanova MD    Interpreted at [facility], [address] [place] [phone]    Final Plan:  Repeat colonoscopy in 7 years.    We will attempt to contact you at appropriate intervals via [place] mail.  We may not be able to find you or contact you at that time, therefore you should know that the responsibility for following our recommendation rests with you.  If you don't hear from us at the time your procedure is due, please contact our office to schedule an appointment.  If your contact information should change, please contact our office so that we can update your record.      _Electronically signed by:___________________  Lázaro Adler MD                 04/28/2025    cc: Jaydon Almonte MD

## 2025-06-06 ENCOUNTER — TELEPHONE (OUTPATIENT)
Dept: FAMILY MEDICINE | Facility: CLINIC | Age: 70
End: 2025-06-06
Payer: MEDICARE

## 2025-06-06 DIAGNOSIS — R97.20 ELEVATED PROSTATE SPECIFIC ANTIGEN (PSA): ICD-10-CM

## 2025-06-06 DIAGNOSIS — I10 BENIGN ESSENTIAL HYPERTENSION: Primary | ICD-10-CM

## 2025-06-06 NOTE — TELEPHONE ENCOUNTER
Patient calling to relay recent BP readings and apologizes for not relaying this update sooner.   He is taking amLODIPine-valsartan (EXFORGE) 5-160 MG tablet once daily and typically checks his BP 3-4x day.  5/31: 10:05 133/82            11:45 115/70   6/1:   09:45  141/97           16:15 131/87  6/2:   09:30 139/93           16:05  137/80           18:45  131/87  6/3:   19:10   133/87  6/5:   17:30  132/89    Patient states he has been busy with work and is trying to take the best care of himself that he can.   He completed his colonoscopy, stays on top of his prostate screening, and is trying to be mindful to keep his overall daily stress levels low. He is also trying to keep track of his sugar intake, however is unsure what products he should stock his fridge with. He estimates his daily sugar intake was roughly 50% of his diet before making dietary changes. He feels it would be helpful if [facility] had a website dedicated to pre-diabetic population that included pictures of preferred products for patients to consume.     Patient states several times he feels his BP was getting worse previously because of his lifestyle and stress with the daily news, linsey casey, [employer], and government issues. He's learning how to calm down with PCP's assistance and adjust to his environment.   He has decreased his time watching the 5:00 news in the last month and feels a little happier as time goes on.   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 This makes him happy.  He also gets upset when he thinks about [employer], states [employer] is a sloppy [employer] that does not care about it's consumers health because the grocery section is filled with sugary foods and cookies, leading to a fat and overweight society. He will never shop at [employer] again and avoids [facility] because [employer] supports [employer].

## 2025-06-08 RX ORDER — HYDROCHLOROTHIAZIDE 12.5 MG/1
12.5 TABLET ORAL DAILY
Qty: 90 TABLET | Refills: 3 | Status: SHIPPED | OUTPATIENT
Start: 2025-06-08

## 2025-06-08 NOTE — TELEPHONE ENCOUNTER
The blood pressures are better on the current dose of Exforge and with improvement in stress levels and lifestyle changes.  That being said, the blood pressure remains above his goal of that less than 130/80.  Therefore I recommend adding another medication to the Exforge.  The new medication is called hydrochlorothiazide.  I sent an prescription to his pharmacy.  He should start taking the medication as directed and schedule an appointment after he has been taking the new drugs for 1-3 weeks to recheck blood pressure and labs.  Please help him set up the appropriate appointments.